# Patient Record
Sex: MALE | Race: WHITE | NOT HISPANIC OR LATINO | Employment: FULL TIME | URBAN - METROPOLITAN AREA
[De-identification: names, ages, dates, MRNs, and addresses within clinical notes are randomized per-mention and may not be internally consistent; named-entity substitution may affect disease eponyms.]

---

## 2018-07-08 ENCOUNTER — HOSPITAL ENCOUNTER (EMERGENCY)
Facility: HOSPITAL | Age: 28
Discharge: HOME | End: 2018-07-08
Payer: COMMERCIAL

## 2018-07-08 VITALS
RESPIRATION RATE: 16 BRPM | DIASTOLIC BLOOD PRESSURE: 72 MMHG | HEART RATE: 93 BPM | SYSTOLIC BLOOD PRESSURE: 115 MMHG | TEMPERATURE: 98.6 F | OXYGEN SATURATION: 99 % | WEIGHT: 156 LBS | BODY MASS INDEX: 21.84 KG/M2 | HEIGHT: 71 IN

## 2018-07-08 DIAGNOSIS — S61.412A LACERATION OF LEFT HAND WITHOUT FOREIGN BODY, INITIAL ENCOUNTER: Primary | ICD-10-CM

## 2018-07-08 PROCEDURE — 99282 EMERGENCY DEPT VISIT SF MDM: CPT

## 2018-07-08 PROCEDURE — 90471 IMMUNIZATION ADMIN: CPT

## 2018-07-08 PROCEDURE — 90715 TDAP VACCINE 7 YRS/> IM: CPT | Performed by: INTERNAL MEDICINE

## 2018-07-08 RX ORDER — LIDOCAINE HYDROCHLORIDE 10 MG/ML
5 INJECTION, SOLUTION EPIDURAL; INFILTRATION; INTRACAUDAL; PERINEURAL ONCE
Status: COMPLETED | OUTPATIENT
Start: 2018-07-08 | End: 2018-07-08

## 2018-07-08 RX ADMIN — LIDOCAINE HYDROCHLORIDE 5 ML: 10 INJECTION, SOLUTION EPIDURAL; INFILTRATION; INTRACAUDAL; PERINEURAL at 17:54

## 2018-07-08 RX ADMIN — TETANUS TOXOID, REDUCED DIPHTHERIA TOXOID AND ACELLULAR PERTUSSIS VACCINE, ADSORBED 0.5 ML: 5; 2.5; 8; 8; 2.5 SUSPENSION INTRAMUSCULAR at 18:15

## 2018-07-08 NOTE — ED PROVIDER NOTES
History  Chief Complaint   Patient presents with    Laceration     left palm index pad  cut with knife  cooking 1 hour ago     PATIENT WAS CUTTING MEAT APART AND LACERATED HIS LEFT PALM WITH A STEAK KNIFE   THIS OCCURRED ABOUT AN HOUR AND HALF AGO  PATIENT'S LAST TETANUS SHOT IS 5-10 YEARS AGO IS UNSURE  HE IS WITHOUT FURTHER COMPLAINT THE PATIENT HAND IS NEUROLOGICALLY AND INTACT  HIS LACERATION IS 1 3 CM IT IS SUBCUTANEOUS NO NERVE INVOLVEMENT NEUROLOGICALLY HE IS INTACT CAPILLARY REFILL REMAINS INTACT  PATIENT HAS FULL MOTION  None       History reviewed  No pertinent past medical history  History reviewed  No pertinent surgical history  History reviewed  No pertinent family history  I have reviewed and agree with the history as documented  Social History   Substance Use Topics    Smoking status: Never Smoker    Smokeless tobacco: Never Used    Alcohol use Not on file      Comment: ocassional        Review of Systems   Constitutional: Negative  HENT: Negative  Respiratory: Negative  Cardiovascular: Negative  Gastrointestinal: Negative  Neurological: Negative  Hematological: Negative  Psychiatric/Behavioral: Negative  Physical Exam  Physical Exam   Constitutional: He appears well-developed and well-nourished  Skin:   BUT A 3 CM LACERATION ON THE PALMAR SURFACE OF THE PATIENT'S LEFT HAND SUBCUTANEOUS NORMAL SENSATION NORMAL GRASP JOINT POSITION SENSE DISTAL PULSES INTACT  NEUROLOGICALLY INTACT         Vital Signs  ED Triage Vitals [07/08/18 1718]   Temperature Pulse Respirations Blood Pressure SpO2   97 5 °F (36 4 °C) (!) 118 18 132/88 98 %      Temp Source Heart Rate Source Patient Position - Orthostatic VS BP Location FiO2 (%)   Tympanic Monitor Sitting Right arm --      Pain Score       2           Vitals:    07/08/18 1718   BP: 132/88   Pulse: (!) 118   Patient Position - Orthostatic VS: Sitting       Visual Acuity      ED Medications  Medications tetanus-diphtheria-acellular pertussis (BOOSTRIX) IM injection 0 5 mL (not administered)   lidocaine (PF) (XYLOCAINE-MPF) 1 % injection 5 mL (5 mL Infiltration Given 7/8/18 8801)       Diagnostic Studies  Results Reviewed     None                 No orders to display              Procedures  Lac Repair  Date/Time: 7/8/2018 6:04 PM  Performed by: Kayleigh Hampton by: Papa Gu   Consent: Verbal consent obtained    Risks and benefits: risks, benefits and alternatives were discussed  Patient understanding: patient states understanding of the procedure being performed  Patient consent: the patient's understanding of the procedure matches consent given  Procedure consent: procedure consent matches procedure scheduled  Relevant documents: relevant documents present and verified  Test results: test results available and properly labeled  Site marked: the operative site was marked  Patient identity confirmed: verbally with patient  Body area: upper extremity  Location details: left hand  Foreign bodies: metal  Tendon involvement: superficial  Nerve involvement: superficial  Anesthesia: local infiltration    Anesthesia:  Local Anesthetic: lidocaine 1% without epinephrine    Sedation:  Patient sedated: no    Wound Dehiscence:  Superficial Wound Dehiscence: simple closure      Procedure Details:  Irrigation solution: saline  Skin closure: 4-0 nylon  Technique: vertical mattress  Approximation: close  Approximation difficulty: simple  Dressing: 4x4 sterile gauze, antibiotic ointment and pressure dressing             Phone Contacts  ED Phone Contact    ED Course                               MDM  CritCare Time    Disposition  Final diagnoses:   Laceration of left hand without foreign body, initial encounter     Time reflects when diagnosis was documented in both MDM as applicable and the Disposition within this note     Time User Action Codes Description Comment    7/8/2018  6:12 PM Sweetie Ibarra [[id number]] Laceration of left hand without foreign body, initial encounter       ED Disposition     ED Disposition Condition Comment    Discharge  [place] discharge to home/self care  Condition at discharge: Good        Follow-up Information    None         Patient's Medications    No medications on file     No discharge procedures on file      ED Provider  Electronically Signed by           Jeronimo Gagnon MD  07/08/18 8663       Jeronimo Gagnon MD  07/08/18 8226

## 2018-08-17 NOTE — ED PROCEDURE NOTE
PROCEDURE  Procedures CORRECTION OF LACERATION ON HAND FOR [employer]  Patient's laceration was 3 cm in length       Virgen Ramos MD  08/17/18 8870

## 2022-03-30 ENCOUNTER — APPOINTMENT (OUTPATIENT)
Dept: PHYSICAL THERAPY | Facility: CLINIC | Age: 32
End: 2022-03-30

## 2022-03-30 PROCEDURE — 97530 THERAPEUTIC ACTIVITIES: CPT

## 2022-11-22 ENCOUNTER — TELEPHONE (OUTPATIENT)
Dept: UROLOGY | Facility: MEDICAL CENTER | Age: 32
End: 2022-11-22

## 2022-11-22 NOTE — TELEPHONE ENCOUNTER
Please Triage -   New Patient- [facility]     What is the reason for the patients appointment? Patient is stating he is having pain on lower back and buttocks area   He is not sure if its his prostate  He cannot explained it  He is concerned about it  Imaging/Lab Results:      Do we accept the patient's insurance or is the patient Self-Pay? Provider & Plan:[facility]    Member ID#:       Has the patient had any previous urologist(s)?no       Have patient records been requested?no        Has the patient had any outside testing done?no       Does the patient have a personal history of cancer?no       Patient can be reached at :[phone]